# Patient Record
Sex: MALE | Race: OTHER | Employment: STUDENT | URBAN - NONMETROPOLITAN AREA
[De-identification: names, ages, dates, MRNs, and addresses within clinical notes are randomized per-mention and may not be internally consistent; named-entity substitution may affect disease eponyms.]

---
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
## 2023-01-13 ENCOUNTER — OFFICE VISIT (OUTPATIENT)
Dept: PRIMARY CARE CLINIC | Age: 18
End: 2023-01-13
Payer: COMMERCIAL

## 2023-01-13 VITALS
WEIGHT: 169.25 LBS | SYSTOLIC BLOOD PRESSURE: 138 MMHG | OXYGEN SATURATION: 99 % | HEIGHT: 70 IN | RESPIRATION RATE: 20 BRPM | TEMPERATURE: 97 F | HEART RATE: 75 BPM | BODY MASS INDEX: 24.23 KG/M2 | DIASTOLIC BLOOD PRESSURE: 88 MMHG

## 2023-01-13 DIAGNOSIS — J02.9 PHARYNGITIS, UNSPECIFIED ETIOLOGY: Primary | ICD-10-CM

## 2023-01-13 DIAGNOSIS — J02.9 SORE THROAT: ICD-10-CM

## 2023-01-13 LAB — S PYO AG THROAT QL: NORMAL

## 2023-01-13 PROCEDURE — 87880 STREP A ASSAY W/OPTIC: CPT | Performed by: STUDENT IN AN ORGANIZED HEALTH CARE EDUCATION/TRAINING PROGRAM

## 2023-01-13 PROCEDURE — 99203 OFFICE O/P NEW LOW 30 MIN: CPT | Performed by: STUDENT IN AN ORGANIZED HEALTH CARE EDUCATION/TRAINING PROGRAM

## 2023-01-13 RX ORDER — LORATADINE 10 MG/1
10 TABLET ORAL DAILY PRN
COMMUNITY
Start: 2022-02-10

## 2023-01-13 RX ORDER — FAMOTIDINE 20 MG/1
20 TABLET, FILM COATED ORAL 2 TIMES DAILY
Qty: 60 TABLET | Refills: 0 | Status: SHIPPED | OUTPATIENT
Start: 2023-01-13

## 2023-01-13 ASSESSMENT — ENCOUNTER SYMPTOMS
COUGH: 1
SHORTNESS OF BREATH: 0
ABDOMINAL PAIN: 0
BLOOD IN STOOL: 0
DIARRHEA: 0
NAUSEA: 0
EYE PAIN: 0
VOMITING: 0
TROUBLE SWALLOWING: 0
SORE THROAT: 1
CONSTIPATION: 0

## 2023-01-13 NOTE — PROGRESS NOTES
[facility]             [address] [place] [address], [place], [address]                        Telephone [phone]             Fax [phone]       Edward Theodore III  :  2005  Age:  21 y.o. MRN:  [id number]  Date of visit:  2023     Assessment and Plan:    1. Pharyngitis, unspecified etiology  Pharyngitis seems to be a chronic issue at this time. Negative strep test in the office today. Has been trying Claritin without much relief. Suspect that this might be silent reflux as he has had GERD symptoms in the past.  Will trial Pepcid 20 mg twice daily to see if this helps with any of his symptoms. This has been ongoing for the last 5 years. I did suggest that he follow-up with his primary care physician to discuss his symptoms in further detail. Recommend giving the Pepcid to try for good 4 weeks. - famotidine (PEPCID) 20 MG tablet; Take 1 tablet by mouth 2 times daily  Dispense: 60 tablet; Refill: 0    2. Sore throat  - POCT rapid strep A      Subjective:    Edward Theodore III is a 21 y.o. male who presents to [facility] today (2023) for evaluation of:  Cough (States has been hacking up blood and mucus for five years. Has never been seen for it though. States when it is blood, it is a good amount. Chest congestion, struggles bringing it up. Throat was very sore this morning, which made him come in as well as the ongoing congestion. )    Patient is a 58-year-old male presents to urgent care for evaluation of early morning cough with mucus production. States that this has been ongoing for the last 5 years. Occasionally he will notice some blood-tinged sputum as well. He states that his throat was very sore this morning which prompted him to come into the urgent care. He states that he has been trying Claritin for his symptoms without much relief.   He states that every single morning he wakes up with throat congestion and ends up coughing up mucus. He states that he does have a history of some reflux however has never tried any antireflux medications. Chief Complaint   Patient presents with    Cough     States has been hacking up blood and mucus for five years. Has never been seen for it though. States when it is blood, it is a good amount. Chest congestion, struggles bringing it up. Throat was very sore this morning, which made him come in as well as the ongoing congestion. He has the following problem list:  Patient Active Problem List   Diagnosis    History of seasonal allergies        Review of Systems   Constitutional:  Negative for chills, fatigue and fever. HENT:  Positive for congestion and sore throat. Negative for ear pain, postnasal drip and trouble swallowing. Eyes:  Negative for pain and visual disturbance. Respiratory:  Positive for cough. Negative for shortness of breath. Cardiovascular:  Negative for chest pain and palpitations. Gastrointestinal:  Negative for abdominal pain, blood in stool, constipation, diarrhea, nausea and vomiting. Genitourinary:  Negative for dysuria and urgency. Skin:  Negative for rash and wound. Neurological:  Negative for dizziness and headaches. Psychiatric/Behavioral:  Negative for dysphoric mood. The patient is not nervous/anxious. Current medications are:  Current Outpatient Medications   Medication Sig Dispense Refill    loratadine (CLARITIN) 10 MG tablet Take 10 mg by mouth daily as needed      famotidine (PEPCID) 20 MG tablet Take 1 tablet by mouth 2 times daily 60 tablet 0     No current facility-administered medications for this visit. He is allergic to seasonal.    He  reports that he has never smoked. He has been exposed to tobacco smoke.  He has never used smokeless tobacco.      Objective:    Vitals:    01/13/23 1437   BP: 138/88   Site: Left Upper Arm   Position: Sitting   Cuff Size: Medium Adult   Pulse: 75   Resp: 20 Temp: 97 °F (36.1 °C)   TempSrc: Tympanic   SpO2: 99%   Weight: 169 lb 4 oz (76.8 kg)   Height: 5' 9.5\" (1.765 m)     Body mass index is 24.64 kg/m². Physical Exam  Vitals and nursing note reviewed. Constitutional:       General: He is not in acute distress. Appearance: He is well-developed. He is not diaphoretic. HENT:      Head: Normocephalic and atraumatic. Right Ear: External ear normal.      Left Ear: External ear normal.      Nose: Nose normal. No congestion or rhinorrhea. Mouth/Throat:      Mouth: Mucous membranes are moist.      Pharynx: Posterior oropharyngeal erythema present. No oropharyngeal exudate. Eyes:      General: No scleral icterus. Right eye: No discharge. Left eye: No discharge. Conjunctiva/sclera: Conjunctivae normal.   Cardiovascular:      Rate and Rhythm: Normal rate and regular rhythm. Heart sounds: Normal heart sounds. No murmur heard. Pulmonary:      Effort: Pulmonary effort is normal.      Breath sounds: Normal breath sounds. Musculoskeletal:      Cervical back: Normal range of motion. Skin:     General: Skin is warm and dry. Findings: No erythema or rash. Neurological:      Mental Status: He is alert and oriented to person, place, and time. Cranial Nerves: No cranial nerve deficit. Psychiatric:         Behavior: Behavior normal.         Thought Content:  Thought content normal.         Judgment: Judgment normal.             (Please note that portions of this note were completed with a voice-recognition program. Efforts were made to edit the dictation but occasionally words are mis-transcribed.)

## 2024-03-20 ENCOUNTER — OFFICE VISIT (OUTPATIENT)
Dept: PRIMARY CARE CLINIC | Age: 19
End: 2024-03-20
Payer: MEDICAID

## 2024-03-20 ENCOUNTER — HOSPITAL ENCOUNTER (OUTPATIENT)
Dept: GENERAL RADIOLOGY | Age: 19
Discharge: HOME | End: 2024-03-22
Payer: MEDICAID

## 2024-03-20 VITALS
TEMPERATURE: 97 F | DIASTOLIC BLOOD PRESSURE: 82 MMHG | WEIGHT: 189 LBS | RESPIRATION RATE: 18 BRPM | BODY MASS INDEX: 27.06 KG/M2 | SYSTOLIC BLOOD PRESSURE: 130 MMHG | OXYGEN SATURATION: 99 % | HEIGHT: 70 IN | HEART RATE: 77 BPM

## 2024-03-20 DIAGNOSIS — R10.32 LEFT LOWER QUADRANT ABDOMINAL PAIN: ICD-10-CM

## 2024-03-20 DIAGNOSIS — M54.9 ACUTE BILATERAL BACK PAIN, UNSPECIFIED BACK LOCATION: Primary | ICD-10-CM

## 2024-03-20 PROCEDURE — 74018 RADEX ABDOMEN 1 VIEW: CPT

## 2024-03-20 PROCEDURE — 99212 OFFICE O/P EST SF 10 MIN: CPT

## 2024-03-20 PROCEDURE — 99213 OFFICE O/P EST LOW 20 MIN: CPT

## 2024-03-20 PROCEDURE — PBSHW PBB SHADOW CHARGE

## 2024-03-20 RX ORDER — KETOROLAC TROMETHAMINE 30 MG/ML
30 INJECTION, SOLUTION INTRAMUSCULAR; INTRAVENOUS ONCE
Status: COMPLETED | OUTPATIENT
Start: 2024-03-20 | End: 2024-03-20

## 2024-03-20 RX ORDER — NAPROXEN 500 MG/1
500 TABLET ORAL 2 TIMES DAILY WITH MEALS
Qty: 60 TABLET | Refills: 0 | Status: SHIPPED | OUTPATIENT
Start: 2024-03-20

## 2024-03-20 RX ADMIN — KETOROLAC TROMETHAMINE 30 MG: 30 INJECTION, SOLUTION INTRAMUSCULAR; INTRAVENOUS at 08:52

## 2024-03-20 SDOH — ECONOMIC STABILITY: FOOD INSECURITY: WITHIN THE PAST 12 MONTHS, YOU WORRIED THAT YOUR FOOD WOULD RUN OUT BEFORE YOU GOT MONEY TO BUY MORE.: NEVER TRUE

## 2024-03-20 SDOH — ECONOMIC STABILITY: HOUSING INSECURITY
IN THE LAST 12 MONTHS, WAS THERE A TIME WHEN YOU DID NOT HAVE A STEADY PLACE TO SLEEP OR SLEPT IN A SHELTER (INCLUDING NOW)?: NO

## 2024-03-20 SDOH — ECONOMIC STABILITY: FOOD INSECURITY: WITHIN THE PAST 12 MONTHS, THE FOOD YOU BOUGHT JUST DIDN'T LAST AND YOU DIDN'T HAVE MONEY TO GET MORE.: NEVER TRUE

## 2024-03-20 SDOH — ECONOMIC STABILITY: INCOME INSECURITY: HOW HARD IS IT FOR YOU TO PAY FOR THE VERY BASICS LIKE FOOD, HOUSING, MEDICAL CARE, AND HEATING?: NOT HARD AT ALL

## 2024-03-20 ASSESSMENT — ENCOUNTER SYMPTOMS
BACK PAIN: 1
VOMITING: 0
ABDOMINAL PAIN: 1
NAUSEA: 1
CONSTIPATION: 0
DIARRHEA: 0

## 2024-03-20 ASSESSMENT — PATIENT HEALTH QUESTIONNAIRE - PHQ9: DEPRESSION UNABLE TO ASSESS: PT REFUSES

## 2024-03-20 NOTE — PROGRESS NOTES
(with meals)     Dispense:  60 tablet     Refill:  0     Pleasant 18 year old male presents to UC today for complaints of generalized back pain that started over a month ago and has progressively worsened. Patient is ambulatory, denies saddle anesthesia, afebrile. 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 Does have moderate pain with palpation to LLQ in abdomen, reports has had bowel movement yesterday but smaller than normal. Denies urinary symptoms. Pain is reproducible to paravertebral muscles of back bilaterally.   IM injection of toradol given to you in clinic today  Naproxen twice a day- start this tomorrow. Do not take any additional NSAIDs (motrin/ibuprofen/advil) while taking naproxen  Follow up with physical therapy  Complete stretches as tolerated  Apply heat for 15-20 minutes, 4-5 times a day  Go to ER for chest pain, shortness of breath, difficulty ambulating, numbness/tingling of groin, fevers or acute worsening of pain      Discussed exam,  plan of care, and follow-up at length with patient/guardian.  Reviewed all prescribed and recommended medications, administration and side effects. Encouraged patient to follow up with PCP or return to the clinic for no improvement and or worsening of symptoms. All questions were answered and they verbalized understanding and were agreeable with the plan.       XR ABDOMEN (KUB) (SINGLE AP VIEW)    Result Date: 3/20/2024  EXAMINATION: ONE SUPINE XRAY VIEW(S) OF THE ABDOMEN 3/20/2024 9:01 am COMPARISON: None. HISTORY: ORDERING SYSTEM PROVIDED HISTORY: Left lower quadrant abdominal pain TECHNOLOGIST PROVIDED HISTORY: LLQ pain, concern for constipation Reason for Exam: LLQ pain FINDINGS: Nonspecific bowel gas pattern without evidence of obstruction. No abnormal calcifications. No acute osseous abnormality.     No evidence of bowel obstruction.  There is normal amount of fecal material in the colon.     Discussed results with

## 2024-03-20 NOTE — PATIENT INSTRUCTIONS
IM injection of toradol given to you in clinic today  Naproxen twice a day- start this tomorrow. Do not take any additional NSAIDs (motrin/ibuprofen/advil) while taking naproxen  Follow up with physical therapy  Complete stretches as tolerated  Apply heat for 15-20 minutes, 4-5 times a day  Go to ER for chest pain, shortness of breath, difficulty ambulating, fevers.

## 2024-03-28 ENCOUNTER — OFFICE VISIT (OUTPATIENT)
Dept: PRIMARY CARE CLINIC | Age: 19
End: 2024-03-28
Payer: MEDICAID

## 2024-03-28 VITALS
OXYGEN SATURATION: 99 % | DIASTOLIC BLOOD PRESSURE: 80 MMHG | SYSTOLIC BLOOD PRESSURE: 112 MMHG | WEIGHT: 183 LBS | BODY MASS INDEX: 26.2 KG/M2 | HEART RATE: 69 BPM | TEMPERATURE: 96.5 F | HEIGHT: 70 IN

## 2024-03-28 DIAGNOSIS — J02.9 SORE THROAT: ICD-10-CM

## 2024-03-28 DIAGNOSIS — J02.9 PHARYNGITIS, UNSPECIFIED ETIOLOGY: Primary | ICD-10-CM

## 2024-03-28 LAB — S PYO AG THROAT QL: NORMAL

## 2024-03-28 PROCEDURE — 87880 STREP A ASSAY W/OPTIC: CPT | Performed by: NURSE PRACTITIONER

## 2024-03-28 PROCEDURE — 99213 OFFICE O/P EST LOW 20 MIN: CPT | Performed by: NURSE PRACTITIONER

## 2024-03-28 PROCEDURE — PBSHW POCT RAPID STREP A: Performed by: NURSE PRACTITIONER

## 2024-03-28 PROCEDURE — 99212 OFFICE O/P EST SF 10 MIN: CPT | Performed by: NURSE PRACTITIONER

## 2024-03-28 RX ORDER — PREDNISONE 20 MG/1
20 TABLET ORAL 2 TIMES DAILY
Qty: 10 TABLET | Refills: 0 | Status: SHIPPED | OUTPATIENT
Start: 2024-03-28 | End: 2024-04-02

## 2024-03-28 ASSESSMENT — ENCOUNTER SYMPTOMS
COUGH: 1
SORE THROAT: 1

## 2024-03-28 NOTE — PROGRESS NOTES
Subjective:      Patient ID: Reynaldo Cardona III is a 18 y.o. male coming in for   Chief Complaint   Patient presents with    Sore Throat     ST x 3 days. Congestion. Fiance had Flu A.         Pharyngitis  This is a new problem. Episode onset: x2-3days. Associated symptoms include coughing and a sore throat. Pertinent negatives include no fever. The symptoms are aggravated by swallowing.         Review of Systems   Constitutional:  Negative for fever.   HENT:  Positive for sore throat.    Respiratory:  Positive for cough.         Objective:/80 (Site: Left Upper Arm, Position: Sitting, Cuff Size: Medium Adult)   Pulse 69   Temp (!) 96.5 °F (35.8 °C) (Tympanic)   Ht 1.778 m (5' 10\")   Wt 83 kg (183 lb)   SpO2 99%   BMI 26.26 kg/m²      Physical Exam  Vitals and nursing note reviewed.   Constitutional:       Appearance: Normal appearance.   HENT:      Head: Normocephalic.      Nose: Nose normal.      Mouth/Throat:      Mouth: Mucous membranes are moist.      Pharynx: Oropharynx is clear. No oropharyngeal exudate or posterior oropharyngeal erythema.   Cardiovascular:      Rate and Rhythm: Normal rate and regular rhythm.      Heart sounds: Normal heart sounds.   Pulmonary:      Effort: Pulmonary effort is normal. No respiratory distress.      Breath sounds: Normal breath sounds. No stridor. No wheezing, rhonchi or rales.   Musculoskeletal:      Cervical back: Neck supple.   Lymphadenopathy:      Cervical: No cervical adenopathy.   Skin:     General: Skin is warm.      Findings: No rash.   Neurological:      Mental Status: He is alert and oriented to person, place, and time.          Assessment:      1. Sore throat           Plan:    Negative rapid strep  Suspect viral uri  Prednisone to help with symptom relief    Orders Placed This Encounter   Procedures    POCT rapid strep A      Outpatient Encounter Medications as of 3/28/2024   Medication Sig Dispense Refill    naproxen (NAPROSYN) 500 MG tablet Take 1 tablet

## 2025-06-30 ENCOUNTER — HOSPITAL ENCOUNTER (EMERGENCY)
Age: 20
Discharge: HOME | End: 2025-06-30
Attending: EMERGENCY MEDICINE
Payer: MEDICAID

## 2025-06-30 ENCOUNTER — APPOINTMENT (OUTPATIENT)
Dept: GENERAL RADIOLOGY | Age: 20
End: 2025-06-30
Payer: MEDICAID

## 2025-06-30 VITALS
DIASTOLIC BLOOD PRESSURE: 61 MMHG | RESPIRATION RATE: 17 BRPM | HEART RATE: 67 BPM | SYSTOLIC BLOOD PRESSURE: 120 MMHG | OXYGEN SATURATION: 96 %

## 2025-06-30 DIAGNOSIS — R07.89 CHEST WALL PAIN: Primary | ICD-10-CM

## 2025-06-30 PROCEDURE — 93005 ELECTROCARDIOGRAM TRACING: CPT | Performed by: EMERGENCY MEDICINE

## 2025-06-30 PROCEDURE — 71046 X-RAY EXAM CHEST 2 VIEWS: CPT

## 2025-06-30 PROCEDURE — 6360000002 HC RX W HCPCS: Performed by: EMERGENCY MEDICINE

## 2025-06-30 PROCEDURE — 96374 THER/PROPH/DIAG INJ IV PUSH: CPT

## 2025-06-30 PROCEDURE — 99284 EMERGENCY DEPT VISIT MOD MDM: CPT

## 2025-06-30 RX ORDER — KETOROLAC TROMETHAMINE 30 MG/ML
15 INJECTION, SOLUTION INTRAMUSCULAR; INTRAVENOUS ONCE
Status: COMPLETED | OUTPATIENT
Start: 2025-06-30 | End: 2025-06-30

## 2025-06-30 RX ADMIN — KETOROLAC TROMETHAMINE 15 MG: 30 INJECTION, SOLUTION INTRAMUSCULAR at 20:27

## 2025-06-30 ASSESSMENT — PAIN SCALES - GENERAL
PAINLEVEL_OUTOF10: 2
PAINLEVEL_OUTOF10: 2

## 2025-06-30 ASSESSMENT — PAIN DESCRIPTION - LOCATION: LOCATION: CHEST

## 2025-06-30 ASSESSMENT — PAIN - FUNCTIONAL ASSESSMENT: PAIN_FUNCTIONAL_ASSESSMENT: 0-10

## 2025-06-30 ASSESSMENT — LIFESTYLE VARIABLES
HOW OFTEN DO YOU HAVE A DRINK CONTAINING ALCOHOL: NEVER
HOW MANY STANDARD DRINKS CONTAINING ALCOHOL DO YOU HAVE ON A TYPICAL DAY: PATIENT DOES NOT DRINK

## 2025-07-01 LAB
EKG ATRIAL RATE: 60 BPM
EKG P AXIS: 72 DEGREES
EKG P-R INTERVAL: 136 MS
EKG Q-T INTERVAL: 390 MS
EKG QRS DURATION: 92 MS
EKG QTC CALCULATION (BAZETT): 390 MS
EKG R AXIS: -2 DEGREES
EKG T AXIS: 19 DEGREES
EKG VENTRICULAR RATE: 60 BPM

## 2025-07-01 NOTE — DISCHARGE INSTRUCTIONS
- I believe your pain is related to the strain of your muscles in your chest and upper shoulder.  -No evidence of any major heart problems or anything else to worry about  -Use over-the-counter pain reliever as needed.  You can use also sports creams  -May want avoid excessive heavy lifting or straining for a couple days  -If there is any new or different symptoms or you think you are getting worse were happy to recheck you.

## 2025-07-01 NOTE — ED PROVIDER NOTES
[facility]  [address] [address]  Phone: [phone]      Patient Name:  Reynaldo Cardona III  Medical Record Number:  [id number]  YOB: 2005  Date of Service:  6/30/2025  Primary Care Physician:  Kalyn Morrow MD      CHIEF COMPLAINT:       Chief Complaint   Patient presents with    Chest Pain    Shortness of Breath    Back Pain       HISTORY OF PRESENT ILLNESS:    Reynaldo Cardona III is a 20 y.o. male who presents with the complaint of chest pain    Patient is a 20-year-old male who arrives via EMS after developing some sharp pains across his chest and left shoulder blade short while ago.  Worse with deep breathing palpation and movement of his upper extremities.  Not worse with exertion.  Without fevers chills.  Denies any history of any cardiopulmonary disease.  He does vape tobacco.  EMS did not document any concerning rhythms.  Patient's pain improved on its own with just relaxing.  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 denies any family history of early heart disease.  No history of PE or risk factors.    CURRENT MEDICATIONS:      Discharge Medication List as of 6/30/2025  9:08 PM        CONTINUE these medications which have NOT CHANGED    Details   naproxen (NAPROSYN) 500 MG tablet Take 1 tablet by mouth 2 times daily (with meals), Disp-60 tablet, R-0Normal      loratadine (CLARITIN) 10 MG tablet Take 1 tablet by mouth daily as neededHistorical Med             ALLERGIES:   is allergic to environmental/seasonal.    PAST MEDICAL HISTORY:    has a past medical history of Asthma, History of seasonal allergies, and Laceration of left index finger w/o foreign body w/o damage to nail.    SURGICAL HISTORY:      has a past surgical history that includes Circumcision.    FAMILY HISTORY:   He indicated that his mother is alive. He indicated that his father is alive. He indicated that his brother is alive. He indicated that